# Patient Record
Sex: FEMALE | Race: WHITE | Employment: FULL TIME | URBAN - METROPOLITAN AREA
[De-identification: names, ages, dates, MRNs, and addresses within clinical notes are randomized per-mention and may not be internally consistent; named-entity substitution may affect disease eponyms.]

---

## 2018-08-23 ENCOUNTER — HOSPITAL ENCOUNTER (EMERGENCY)
Facility: CLINIC | Age: 48
Discharge: HOME | End: 2018-08-23
Attending: PSYCHIATRY & NEUROLOGY | Admitting: PSYCHIATRY & NEUROLOGY
Payer: COMMERCIAL

## 2018-08-23 ENCOUNTER — HOSPITAL ENCOUNTER (EMERGENCY)
Facility: CLINIC | Age: 48
Discharge: HOME | End: 2018-08-23
Attending: EMERGENCY MEDICINE | Admitting: EMERGENCY MEDICINE
Payer: COMMERCIAL

## 2018-08-23 VITALS
SYSTOLIC BLOOD PRESSURE: 112 MMHG | WEIGHT: 175 LBS | HEIGHT: 65 IN | RESPIRATION RATE: 18 BRPM | TEMPERATURE: 98.1 F | DIASTOLIC BLOOD PRESSURE: 66 MMHG | OXYGEN SATURATION: 96 % | HEART RATE: 94 BPM | BODY MASS INDEX: 29.16 KG/M2

## 2018-08-23 VITALS
DIASTOLIC BLOOD PRESSURE: 76 MMHG | WEIGHT: 175 LBS | OXYGEN SATURATION: 96 % | RESPIRATION RATE: 16 BRPM | SYSTOLIC BLOOD PRESSURE: 108 MMHG | TEMPERATURE: 97.4 F | BODY MASS INDEX: 29.12 KG/M2 | HEART RATE: 116 BPM

## 2018-08-23 DIAGNOSIS — F41.9 ANXIETY DISORDER, UNSPECIFIED TYPE: ICD-10-CM

## 2018-08-23 DIAGNOSIS — F41.9 ANXIETY: ICD-10-CM

## 2018-08-23 DIAGNOSIS — F41.9 SEVERE ANXIETY: ICD-10-CM

## 2018-08-23 DIAGNOSIS — R45.89 DEPRESSED MOOD: ICD-10-CM

## 2018-08-23 PROCEDURE — 25000128 H RX IP 250 OP 636: Performed by: EMERGENCY MEDICINE

## 2018-08-23 PROCEDURE — 99285 EMERGENCY DEPT VISIT HI MDM: CPT | Mod: 25 | Performed by: PSYCHIATRY & NEUROLOGY

## 2018-08-23 PROCEDURE — 90791 PSYCH DIAGNOSTIC EVALUATION: CPT

## 2018-08-23 PROCEDURE — 99284 EMERGENCY DEPT VISIT MOD MDM: CPT | Performed by: EMERGENCY MEDICINE

## 2018-08-23 PROCEDURE — 99284 EMERGENCY DEPT VISIT MOD MDM: CPT | Mod: Z6 | Performed by: PSYCHIATRY & NEUROLOGY

## 2018-08-23 PROCEDURE — 25000132 ZZH RX MED GY IP 250 OP 250 PS 637: Performed by: EMERGENCY MEDICINE

## 2018-08-23 PROCEDURE — 96372 THER/PROPH/DIAG INJ SC/IM: CPT | Performed by: EMERGENCY MEDICINE

## 2018-08-23 PROCEDURE — 99284 EMERGENCY DEPT VISIT MOD MDM: CPT | Mod: Z6 | Performed by: EMERGENCY MEDICINE

## 2018-08-23 PROCEDURE — 25000132 ZZH RX MED GY IP 250 OP 250 PS 637: Performed by: PSYCHIATRY & NEUROLOGY

## 2018-08-23 RX ORDER — HYDROXYZINE HYDROCHLORIDE 25 MG/1
50 TABLET, FILM COATED ORAL ONCE
Status: COMPLETED | OUTPATIENT
Start: 2018-08-23 | End: 2018-08-23

## 2018-08-23 RX ORDER — ONDANSETRON 4 MG/1
4 TABLET, ORALLY DISINTEGRATING ORAL ONCE
Status: DISCONTINUED | OUTPATIENT
Start: 2018-08-23 | End: 2018-08-23

## 2018-08-23 RX ORDER — QUETIAPINE FUMARATE 50 MG/1
50 TABLET, EXTENDED RELEASE ORAL AT BEDTIME
Qty: 30 EACH | Refills: 1 | Status: SHIPPED | OUTPATIENT
Start: 2018-08-23

## 2018-08-23 RX ORDER — CLONAZEPAM 1 MG/1
1 TABLET ORAL 2 TIMES DAILY PRN
COMMUNITY

## 2018-08-23 RX ORDER — HYDROXYZINE HYDROCHLORIDE 25 MG/ML
50 INJECTION, SOLUTION INTRAMUSCULAR ONCE
Status: DISCONTINUED | OUTPATIENT
Start: 2018-08-23 | End: 2018-08-23 | Stop reason: RX

## 2018-08-23 RX ORDER — VENLAFAXINE HYDROCHLORIDE 150 MG/1
186 CAPSULE, EXTENDED RELEASE ORAL DAILY
COMMUNITY
Start: 2016-09-21

## 2018-08-23 RX ORDER — BUSPIRONE HYDROCHLORIDE 7.5 MG/1
7.5 TABLET ORAL 3 TIMES DAILY
COMMUNITY

## 2018-08-23 RX ORDER — QUETIAPINE FUMARATE 25 MG/1
25-50 TABLET, FILM COATED ORAL 2 TIMES DAILY PRN
Qty: 60 TABLET | Refills: 0 | Status: SHIPPED | OUTPATIENT
Start: 2018-08-23 | End: 2018-08-23

## 2018-08-23 RX ORDER — VENLAFAXINE HYDROCHLORIDE 75 MG/1
75 CAPSULE, EXTENDED RELEASE ORAL
COMMUNITY
Start: 2016-09-21 | End: 2018-08-23 | Stop reason: DRUGHIGH

## 2018-08-23 RX ORDER — HYDROXYZINE HYDROCHLORIDE 50 MG/ML
50 INJECTION, SOLUTION INTRAMUSCULAR ONCE
Status: COMPLETED | OUTPATIENT
Start: 2018-08-23 | End: 2018-08-23

## 2018-08-23 RX ORDER — ONDANSETRON 4 MG/1
4 TABLET, ORALLY DISINTEGRATING ORAL EVERY 8 HOURS PRN
Qty: 10 TABLET | Refills: 1 | Status: SHIPPED | OUTPATIENT
Start: 2018-08-23

## 2018-08-23 RX ORDER — TRIAMCINOLONE ACETONIDE 1 MG/G
CREAM TOPICAL
COMMUNITY
Start: 2017-04-05

## 2018-08-23 RX ORDER — HYDROXYZINE HYDROCHLORIDE 25 MG/1
25-50 TABLET, FILM COATED ORAL EVERY 6 HOURS PRN
Qty: 90 TABLET | Refills: 1 | Status: SHIPPED | OUTPATIENT
Start: 2018-08-23

## 2018-08-23 RX ORDER — QUETIAPINE FUMARATE 50 MG/1
50 TABLET, EXTENDED RELEASE ORAL ONCE
Status: COMPLETED | OUTPATIENT
Start: 2018-08-23 | End: 2018-08-23

## 2018-08-23 RX ORDER — QUETIAPINE FUMARATE 25 MG/1
50 TABLET, FILM COATED ORAL 2 TIMES DAILY
Status: DISCONTINUED | OUTPATIENT
Start: 2018-08-23 | End: 2018-08-23 | Stop reason: HOSPADM

## 2018-08-23 RX ADMIN — QUETIAPINE FUMARATE 50 MG: 50 TABLET, EXTENDED RELEASE ORAL at 22:55

## 2018-08-23 RX ADMIN — HYDROXYZINE HYDROCHLORIDE 50 MG: 50 INJECTION, SOLUTION INTRAMUSCULAR at 07:52

## 2018-08-23 RX ADMIN — HYDROXYZINE HYDROCHLORIDE 50 MG: 25 TABLET ORAL at 22:54

## 2018-08-23 RX ADMIN — QUETIAPINE 50 MG: 25 TABLET ORAL at 07:53

## 2018-08-23 ASSESSMENT — ENCOUNTER SYMPTOMS
BACK PAIN: 0
CONSTITUTIONAL NEGATIVE: 1
ABDOMINAL PAIN: 0
NERVOUS/ANXIOUS: 1
CHEST TIGHTNESS: 0
DIZZINESS: 0
SLEEP DISTURBANCE: 1
HALLUCINATIONS: 0
DYSPHORIC MOOD: 1
SHORTNESS OF BREATH: 0
ABDOMINAL PAIN: 0
SLEEP DISTURBANCE: 1
HEMATOLOGIC/LYMPHATIC NEGATIVE: 1
VOMITING: 0
NERVOUS/ANXIOUS: 1
DYSPHORIC MOOD: 1
HALLUCINATIONS: 0
DECREASED CONCENTRATION: 1
FEVER: 0
CHEST TIGHTNESS: 0
NAUSEA: 1
NEUROLOGICAL NEGATIVE: 1
ALLERGIC/IMMUNOLOGIC NEGATIVE: 1
SHORTNESS OF BREATH: 0

## 2018-08-23 NOTE — ED AVS SNAPSHOT
[facility] Emergency Department    [address]    [facility] [address]    Phone:  [phone]    Fax:  [phone]                                       Li Purcell   MRN: [id number]    Department:  [facility] Emergency Department   Date of Visit:  8/23/2018           Patient Information     Date Of Birth          1970        Your diagnoses for this visit were:     Anxiety        You were seen by Melvin Will MD.      Follow-up Information     Follow up with Your mental health care provider In 1 day.    Why:  Tomorrow as scheduled        Discharge Instructions         Understanding Anxiety Disorders  Almost everyone gets nervous now and then. It s normal to have knots in your stomach before a test, or for your heart to race on a first date. But an anxiety disorder is much more than a case of nerves. In fact, its symptoms may be overwhelming. But treatment can relieve many of these symptoms. Talking to your healthcare provider is the first step.    What are anxiety disorders?  An anxiety disorder causes intense feelings of panic and fear. These feelings may arise for no apparent reason. And they tend to recur again and again. They may prevent you from coping with life and cause you great distress. As a result, you may avoid anything that triggers your fear. In extreme cases, you may never leave the house. Anxiety disorders may cause other symptoms, such as:    Obsessive thoughts you can t control    Constant nightmares or painful thoughts of the past    Nausea, sweating, and muscle tension    Trouble sleeping or concentrating  What causes anxiety disorders?  Anxiety disorders tend to run in families. For some people, childhood abuse or neglect may play a role. For others, stressful life events or trauma may trigger anxiety disorders. Anxiety can trigger low self-esteem and poor coping skills.  Common anxiety disorders    Panic disorder. This causes an intense fear of being in  danger.    Phobias. These are extreme fears of certain objects, places, or events.    Obsessive-compulsive disorder. This causes you to have unwanted thoughts and urges. You also may perform certain actions over and over.    Posttraumatic stress disorder. This occurs in people who have survived a terrible ordeal. It can cause nightmares and flashbacks about the event.    Generalized anxiety disorder. This causes constant worry that can greatly disrupt your life.   Getting better  You may believe that nothing can help you. Or, you might fear what others may think. But most anxiety symptoms can be eased. Having an anxiety disorder is nothing to be ashamed of. Most people do best with treatment that combines medicine and therapy. These aren t cures. But they can help you live a healthier life.  Date Last Reviewed: 2/1/2017 2000-2017 [employer]. [address] [place] [address]. All rights reserved. This information is not intended as a substitute for professional medical care. Always follow your healthcare professional's instructions.          Treating Anxiety Disorders with Medicine  An anxiety disorder can make you feel nervous or apprehensive, even without a clear reason. In people age 65 and older, generalized anxiety disorder is one of the most commonly diagnosed anxiety disorders. Many times it occurs with depression. Certain anxiety disorders can cause intense feelings of fear or panic. You may even have physical symptoms such as a racing heartbeat, sweating, or dizziness. If you have these feelings, you don t have to suffer anymore. Treatment to help you overcome your fears will likely include therapy (also called counseling). Medicine may also be prescribed to help control your symptoms.    Medicines  Certain medicines may be prescribed to help control your symptoms. So you may feel less anxious. You may also feel able to move forward with therapy. At first, medicines and dosages may  need to be adjusted to find what works best for you. Try to be patient. Tell your healthcare provider how a medicine makes you feel. This way, you can work together to find the treatment that s best for you. Keep in mind that medicines can have side effects. Talk with your provider about any side effects that are bothering you. Changing the dose or type of medicine may help. Don t stop taking medicine on your own. That can cause symptoms to come back.    Anti-anxiety medicine. This medicine eases symptoms and helps you relax. Your healthcare provider will explain when and how to use it. It may be prescribed for use before situations that make you anxious. You may also be told to take medicine on a regular schedule. Anti-anxiety medicine may make you feel a little sleepy or  out of it.  Don t drive a car or operate machinery while on this medicine, until you know how it affects you.  Caution  Never use alcohol or other drugs with anti-anxiety medicines. This could result in loss of muscular control, sedation, coma, or death. Also, use only the amount of medicine prescribed for you. If you think you may have taken too much, get emergency care right away.     Antidepressant medicine. This kind of medicine is often used to treat anxiety, even if you aren t depressed. An antidepressant helps balance out brain chemicals. This helps keep anxiety under control. This medicine is taken on a schedule. It takes a few weeks to start working. If you don t notice a change at first, you may just need more time. But if you don t notice results after the first few weeks, tell your provider.  Keep taking medicines as prescribed  Never change your dosage, share or use another person's medicine, or stop taking your medicines without talking to your healthcare provider first. Keep the following in mind:    Some medicines must be taken on a schedule. Make this part of your daily routine. For instance, always take your pill before brushing  your teeth. A pillbox can help you remember if you ve taken your medicine each day.    Medicines are often taken for 6 to 12 months. Your healthcare provider will then evaluate whether you need to stay on them. Many people who have also had therapy may no longer need medicine to manage anxiety.    You may need to stop taking medicine slowly to give your body time to adjust. When it s time to stop, your healthcare provider will tell you more. Remember: Never stop taking your medicine without talking to your provider first.    If symptoms return, you may need to start taking medicines again. This isn t your fault. It s just the nature of your anxiety disorder.  Special concerns    Side effects. Medicines may cause side effects. Ask your healthcare provider or pharmacist what you can expect. They may have ideas for avoiding some side effects.    Sexual problems. Some antidepressants can affect your desire for sex or your ability to have an orgasm. A change in dosage or medicine often solves the problem. If you have a sexual side effect that concerns you, tell your healthcare provider.    Addiction. If you ve never had a problem with drugs or alcohol, you may not have a problem with medicines used to treat anxiety disorders. But always discuss the medicines with your healthcare provider before taking them. If you have a history of addiction, you may not be able to use certain medicines used to treat anxiety disorders.    Medicine interactions. Always check with your pharmacist before using any over-the-counter medicines, including herbal supplements.   Date Last Reviewed: 5/1/2017 2000-2017 [employer]. [address] [place] [address], [place] [address]. All rights reserved. This information is not intended as a substitute for professional medical care. Always follow your healthcare professional's instructions.          Treating Anxiety Disorders with Therapy    If you have an anxiety disorder, you don t have to  suffer anymore. Treatment is available. Therapy (also called counseling) is often a helpful treatment for anxiety disorders. With therapy, a specially trained professional (therapist) helps you face and learn to manage your anxiety. Therapy can be short-term or long-term depending on your needs. In some cases, medicine may also be prescribed with therapy. It may take time before you notice how much therapy is helping, but stick with it. With therapy, you can feel better.  Cognitive behavioral therapy (CBT)  Cognitive behavioral therapy (CBT) teaches you to manage anxiety. It does this by helping you understand how you think and act when you re anxious. Research has shown CBT to be a very effective treatment for anxiety disorders. How CBT is run is almost like a class. It involves homework and activities to build skills that teach you to cope with anxiety step by step. It can be done in a group or one-on-one, and often takes place for a set number of sessions. CBT has two main parts:    Cognitive therapy helps you identify the negative, irrational thoughts that occur with your anxiety. You ll learn to replace these with more positive, realistic thoughts.    Behavioral therapy helps you change how you react to anxiety. You ll learn coping skills and methods for relaxing to help you better deal with anxiety.  Other forms of therapy  Other therapy methods may work better for you than CBT. Or, you may move from CBT to another form of therapy as your treatment needs change. This may mean meeting with a therapist by yourself or in a group. Therapy can also help you work through problems in your life, such as drug or alcohol dependence, that may be making your anxiety worse.  Getting better takes time  Therapy will help you feel better and teach you skills to help manage anxiety long term. But change doesn t happen right away. It takes a commitment from you. And treatment only works if you learn to face the causes of your  anxiety. So, you might feel worse before you feel better. This can sometimes make it hard to stick with it. But remember: Therapy is a very effective treatment. The results will be well worth it.  Helping yourself  If anxiety is wearing you down, here are some things you can do to cope:    Check with your doctor and rule out any physical problems that may be causing the anxiety symptoms.    If an anxiety disorder is diagnosed, seek mental healthcare. This is an illness and it can respond to treatment. Most types of anxiety disorders will respond to talk therapy and medicine.    Educate yourself about anxiety disorders. Keep track of helpful online resources and books you can use during stressful periods.    Try stress management techniques such as meditation.    Consider online or in-person support groups.    Don t fight your feelings. Anxiety feeds itself. The more you worry about it, the worse it gets. Instead, try to identify what might have triggered your anxiety. Then try to put this threat in perspective.    Keep in mind that you can t control everything about a situation. Change what you can and let the rest take its course.    Exercise -- it s a great way to relieve tension and help your body feel relaxed.    Examine your life for stress, and try to find ways to reduce it.    Avoid caffeine and nicotine, which can make anxiety symptoms worse.    Fight the temptation to turn to alcohol or unprescribed drugs for relief. They only make things worse in the long run.   Date Last Reviewed: 1/1/2017 2000-2017 [employer] [address] [place] [address], [place] [address]. All rights reserved. This information is not intended as a substitute for professional medical care. Always follow your healthcare professional's instructions.          24 Hour Appointment Hotline       To make an appointment at any [facility], call [phone] ([phone]). If you don't have a family doctor or clinic, we will  help you find one. [facility] are conveniently located to serve the needs of you and your family.             Review of your medicines      START taking        Dose / Directions Last dose taken    QUEtiapine 25 MG tablet   Commonly known as:  SEROQUEL   Dose:  25-50 mg   Quantity:  60 tablet        Take 1-2 tablets (25-50 mg) by mouth 2 times daily as needed   Refills:  0          Our records show that you are taking the medicines listed below. If these are incorrect, please call your family doctor or clinic.        Dose / Directions Last dose taken    busPIRone 7.5 MG tablet   Commonly known as:  BUSPAR   Dose:  7.5 mg        Take 7.5 mg by mouth 3 times daily   Refills:  0        clonazePAM 1 MG tablet   Commonly known as:  klonoPIN   Dose:  1 mg        Take 1 mg by mouth 2 times daily as needed for anxiety   Refills:  0        traZODone 25 mg Tabs half-tab   Commonly known as:  DESYREL   Dose:  150 mg        Take 150 mg by mouth At Bedtime   Refills:  0        triamcinolone 0.1 % cream   Commonly known as:  KENALOG        Refills:  0        * venlafaxine 150 MG 24 hr capsule   Commonly known as:  EFFEXOR-XR   Dose:  150 mg        Take 150 mg by mouth   Refills:  0        * venlafaxine 75 MG 24 hr capsule   Commonly known as:  EFFEXOR-XR   Dose:  75 mg        Take 75 mg by mouth   Refills:  0        * Notice:  This list has 2 medication(s) that are the same as other medications prescribed for you. Read the directions carefully, and ask your doctor or other care provider to review them with you.            Prescriptions were sent or printed at these locations (1 Prescription)                   [facility] [place], [place] - [address]   [address] [facility] [address]    Telephone:  [phone]   Fax:  [phone]   Hours:                  E-Prescribed (1 of 1)         QUEtiapine (SEROQUEL) 25 MG tablet                Orders Needing Specimen Collection      "None      Pending Results     No orders found from 2018 to 2018.            Pending Culture Results     No orders found from 2018 to 2018.            Pending Results Instructions     If you had any lab results that were not finalized at the time of your Discharge, you can call the ED Lab Result RN at [phone]. You will be contacted by this team for any positive Lab results or changes in treatment. The nurses are available 7 days a week from 10A to 6:30P.  You can leave a message 24 hours per day and they will return your call.        Test Results From Your Hospital Stay               Thank you for choosing [facility]       Thank you for choosing [facility] for your care. Our goal is always to provide you with excellent care. Hearing back from our patients is one way we can continue to improve our services. Please take a few minutes to complete the written survey that you may receive in the mail after you visit with us. Thank you!        [employer] Information     [employer] lets you send messages to your doctor, view your test results, renew your prescriptions, schedule appointments and more. To sign up, go to [email or web address] . Click on \"Log in\" on the left side of the screen, which will take you to the Welcome page. Then click on \"Sign up Now\" on the right side of the page.     You will be asked to enter the access code listed below, as well as some personal information. Please follow the directions to create your username and password.     Your access code is: [email or web address]  Expires: 2018  8:34 AM     Your access code will  in 90 days. If you need help or a new code, please call your [facility] clinic or [phone].        Care EveryWhere ID     This is your Care EveryWhere ID. This could be used by other organizations to access your [facility] medical records  [id number]        Equal Access to Services     PATEL LEIVA : katie Moura qaybta " noelle allen ah. So [facility] [phone].    ATENCIÓN: Si habla español, tiene a duff disposición servicios gratuitos de asistencia lingüística. Llame al [phone].    We comply with applicable federal civil rights laws and [place] laws. We do not discriminate on the basis of race, color, national origin, age, disability, sex, sexual orientation, or gender identity.            After Visit Summary       This is your record. Keep this with you and show to your community pharmacist(s) and doctor(s) at your next visit.

## 2018-08-23 NOTE — ED AVS SNAPSHOT
[facility] Emergency Department    [address] [facility] [address]    Phone:  [phone]    Fax:  [phone]                                       Li Purcell   MRN: [id number]    Department:  [facility] Emergency Department   Date of Visit:  8/23/2018           After Visit Summary Signature Page     I have received my discharge instructions, and my questions have been answered. I have discussed any challenges I see with this plan with the nurse or doctor.    ..........................................................................................................................................  Patient/Patient Representative Signature      ..........................................................................................................................................  Patient Representative Print Name and Relationship to Patient    ..................................................               ................................................  Date                                            Time    ..........................................................................................................................................  Reviewed by Signature/Title    ...................................................              ..............................................  Date                                                            Time

## 2018-08-23 NOTE — ED PROVIDER NOTES
History     Chief Complaint   Patient presents with     Anxiety     started yesterday morning, continues today. racing thoughts, nausea, feel overwhelmed, unable to settle self.      MODESTO Purcell is a 48 year old female with history of anxiety disorder who presents with complaint of worsening anxiety in the past 24 hours.  She is feeling anxious, nervous, restlessness, on edge, overwhelmed, and has muscle tension and nausea.  No vomiting.  Symptoms are refractory to Effexor/Venlafaxine 187.5 mg once daily, BuSpar 7.5 twice daily which was initiated 2 days ago, and Clonazepam 0.5 mg once or twice daily.  Last dose of Clonazepam was several hours ago.  She reports worsening anxiety since medication change in late June, approximately 2 months ago.  She reports that at that time was started on Abilify and had Effexor dose increased.  She has been off Abilify for approximately 1 month.  BuSpar was just started 2 days ago by her mental health care provider.  She was started on 7.5 mg twice daily with plan to increase this to 15 mg twice daily in 1 week. She reports that she was previously on BuSpar and discontinued this due to side effects.  She reports that she has an appointment with her mental health care provider tomorrow.  She is previously used Seroquel with good success, but stopped this due to sedation making it difficult for her to work.  She has not been working the past month due to overwhelming anxiety.  She denies suicidal ideation.  No other acute complaints or concerns.    Previous Records in Care Everywhere were Reviewed:  Allergies    Active Allergy Reactions Severity Noted Date Comments   Erythromycin Unknown   04/18/2016     Indomethacin Rash   04/18/2016 Rash with associated blisters. No other mucosal involvement.     Prochlorperazine Other, see comments   04/18/2016 Muscle tightening     Current Medications    Prescription Sig. Disp. Refills Start Date End Date Status   QUEtiapine (SEROQUEL)  50 MG tablet    Indications: Depression, unspecified depression type Take 1 Tab by mouth daily at bedtime. 90 Tab   3 2016   Active   venlafaxine (EFFEXORXR) 150 MG 24 hour release capsule    Indications: Depression, unspecified depression type Take 1 Cap by mouth daily. Take 1 capsule by mouth. together with 75mg for total of 225mg for mood 90 Cap   3 2016   Active   venlafaxine (EFFEXORXR) 75 MG 24 hour release capsule    Indications: Depression, unspecified depression type Take 1 Cap by mouth daily. 90 Cap   3 2016   Active   triamcinolone acetonide (KENALOG) 0.1 % cream   Apply topically two times a day. Apply thin layer to affected area twice daily as needed 80 g   0 2017   Active   HYDROcodone-acetaminophen (NORCO) 5-325 MG tablet    Indications: Chronic low back pain, unspecified back pain laterality, with sciatica presence unspecified TAKE ONE TO TWO TABLETS BY MOUTH EVERY 6 HOURS AS NEEDED FOR LOW BACK PAIN 20 Tab   0 2017   Active   Active Problems  Reconcile with Patient's Chart    Problem Noted Date   Chronic constipation 2016   ANNALISA (obstructive sleep apnea) 2016   Low back pain ([facility]) 2013   Adjustment disorder with mixed anxiety and depressed mood ([facility]) 2007   Most Recent Encounters    Date Type Specialty Care Team Description   10/18/2017 Telephone Family Medicine Cecilia Lopez MD   REFERRAL REQUEST (Diagnostic mammogram)   2017 Telephone Family Medicine Cecilia Lopez MD   FYI   04/10/2017 Telephone Pediatrics Cecilia Lopez MD   RESULTS, TEST (positive pertussis exposure )   Immunizations  Reconcile with Patient's Chart    Name Dates Previously Given Next Due   Td 2003     Tdap 2011, 2002, 1988     Surgical History    Surgery Date Laterality Comments   TONSILLECTOMY 1972       LAP CHOLECYSTECTOMY         SECTION 1997   x1 got to 10cm and 3 hrs pushing   VAGINAL DELIVERY 1999   x1   Medical  History    Medical History Date Comments   Adjustment disorder with mixed anxiety and depressed mood ([facility])      Nondependent alcohol abuse, in remission   no tx, sober since    Other, mixed, or unspecified nondependent drug abuse, unspecified   cocaine, quit in    Chronic constipation 2016     ANNALISA (obstructive sleep apnea) 2016     Family History    Medical History Relation Name Comments   Cancer Birth Father   dec age 59, lung ca, had dm, htn   Depression Birth Father       Hyperlipidemia Birth Father       Hypertension Birth Father       Anxiety Birth Mother       Osteoporosis Birth Mother       Thyroid Disorder Birth Mother   alive, thyroid   Alcohol/Drug Abuse Brother   alive, 9 yrs older, alcoholism, aortic aneurysm, depression   Depression Brother       Good Health Brother   alive, 10 yrs older   Cancer, Breast Maternal Grandmother   dec age 70s, breast ca   Cancer, Other Maternal Grandmother       Cancer, Other Paternal Grandfather       Dementia Paternal Grandmother       Depression Paternal Grandmother       Good Health Sister   alive, 4 yrs older     Relation Name Status Comments   Birth Father    (Age 59)     Birth Mother   Alive     Brother   Alive     Brother   Alive     Brother         Maternal Grandfather        Maternal Grandmother        Paternal Grandfather        Paternal Grandmother        Sister   Alive     Sister         Social History    Tobacco Use Types Packs/Day Years Used Date   Never Smoker           Smokeless Tobacco: Never Used           Alcohol Use Drinks/Week oz/Week Comments   No          Problem List:    There are no active problems to display for this patient.       Past Medical History:    Past Medical History:   Diagnosis Date     Anxiety        Past Surgical History:    Past Surgical History:   Procedure Laterality Date     CHOLECYSTECTOMY       GYN SURGERY         Family History:    No family history on  "file.    Social History:  Marital Status:   [2]  Social History   Substance Use Topics     Smoking status: Never Smoker     Smokeless tobacco: Never Used     Alcohol use No        Medications:      busPIRone (BUSPAR) 7.5 MG tablet   clonazePAM (KLONOPIN) 1 MG tablet   ondansetron (ZOFRAN ODT) 4 MG ODT tab   QUEtiapine (SEROQUEL) 25 MG tablet   traZODone (DESYREL) 25 mg TABS half-tab   triamcinolone (KENALOG) 0.1 % cream   venlafaxine (EFFEXOR-XR) 150 MG 24 hr capsule   venlafaxine (EFFEXOR-XR) 75 MG 24 hr capsule       Review of Systems   Constitutional: Negative.    HENT: Negative.    Respiratory: Negative for chest tightness and shortness of breath.    Cardiovascular: Negative for chest pain and leg swelling.   Gastrointestinal: Positive for nausea. Negative for abdominal pain and vomiting.   Genitourinary: Negative.    Allergic/Immunologic: Negative.    Neurological: Negative.    Hematological: Negative.    Psychiatric/Behavioral: Positive for decreased concentration, dysphoric mood and sleep disturbance. Negative for hallucinations and suicidal ideas. The patient is nervous/anxious.          Physical Exam   BP: 112/66  Pulse: 94  Temp: 98.1  F (36.7  C)  Resp: 18  Height: 165.1 cm (5' 5\")  Weight: 79.4 kg (175 lb)  SpO2: 96 %      Physical Exam   Constitutional: She is oriented to person, place, and time. She appears well-developed and well-nourished. She appears distressed.   HENT:   Head: Normocephalic and atraumatic.   Mouth/Throat: Oropharynx is clear and moist.   Eyes: Conjunctivae and EOM are normal. No scleral icterus.   Neck: Normal range of motion. Neck supple. No tracheal deviation present. No thyromegaly present.   Cardiovascular: Normal rate, regular rhythm and normal heart sounds.    Pulmonary/Chest: Effort normal and breath sounds normal. No respiratory distress.   Abdominal: Soft. She exhibits no distension. There is no tenderness.   Musculoskeletal: Normal range of motion. She exhibits no " edema.   Neurological: She is alert and oriented to person, place, and time. She has normal strength. Gait normal.   Skin: Skin is warm and dry. No rash noted. She is not diaphoretic. No erythema. No pallor.   Psychiatric: Her speech is normal. Her mood appears anxious ( Anxious affect). She is not actively hallucinating. Thought content is not paranoid and not delusional. She exhibits a depressed mood ( Depressed mood). She expresses no suicidal ideation.   Nursing note and vitals reviewed.      ED Course     ED Course     Procedures                 No results found for this or any previous visit (from the past 24 hour(s)).    Medications   hydrOXYzine (VISTARIL) injection 50 mg (50 mg Intramuscular Given 8/23/18 0752)       Assessments & Plan (with Medical Decision Making)   Patient with anxiety disorder with severe anxiety for refractory to Effexor, Clonazepam and BuSpar started 2 days ago.  She has previously had good success with Seroquel for anxiety, but discontinued this due to sedation which caused difficulty working during the day.  She has not been able to work in the past 1 month due to severe anxiety.  We will have her restart Seroquel 25-50 mg twice daily and follow-up as scheduled with her mental health care provider tomorrow.  She expressed disappointment that her mental health care provider has not been able to help her gain control of her anxiety with medication adjustment, and she has not made an appointment with a psychiatrist due to the long wait to see one (appointment for 2.5 months out).  She is not followed by a psychologist/therapist.  I encouraged her to establish care with these and discuss referral and use of these resources with her mental health care provider tomorrow, in schedule follow-up.  She is not suicidal, psychotic or an active danger to herself or others.  She was discharged with her  will sister, and instructions for supportive care.    I have reviewed the nursing  notes.    I have reviewed the findings, diagnosis, plan and need for follow up with the patient.    Discharge Medication List as of 8/23/2018  8:34 AM      START taking these medications    Details   QUEtiapine (SEROQUEL) 25 MG tablet Take 1-2 tablets (25-50 mg) by mouth 2 times daily as needed, Disp-60 tablet, R-0, E-Prescribe             Final diagnoses:   Severe anxiety   Anxiety disorder, unspecified type   Depressed mood       8/23/2018   [facility] EMERGENCY DEPARTMENT     Melvin Will MD  08/23/18 7753

## 2018-08-23 NOTE — DISCHARGE INSTRUCTIONS
Understanding Anxiety Disorders  Almost everyone gets nervous now and then. It s normal to have knots in your stomach before a test, or for your heart to race on a first date. But an anxiety disorder is much more than a case of nerves. In fact, its symptoms may be overwhelming. But treatment can relieve many of these symptoms. Talking to your healthcare provider is the first step.    What are anxiety disorders?  An anxiety disorder causes intense feelings of panic and fear. These feelings may arise for no apparent reason. And they tend to recur again and again. They may prevent you from coping with life and cause you great distress. As a result, you may avoid anything that triggers your fear. In extreme cases, you may never leave the house. Anxiety disorders may cause other symptoms, such as:    Obsessive thoughts you can t control    Constant nightmares or painful thoughts of the past    Nausea, sweating, and muscle tension    Trouble sleeping or concentrating  What causes anxiety disorders?  Anxiety disorders tend to run in families. For some people, childhood abuse or neglect may play a role. For others, stressful life events or trauma may trigger anxiety disorders. Anxiety can trigger low self-esteem and poor coping skills.  Common anxiety disorders    Panic disorder. This causes an intense fear of being in danger.    Phobias. These are extreme fears of certain objects, places, or events.    Obsessive-compulsive disorder. This causes you to have unwanted thoughts and urges. You also may perform certain actions over and over.    Posttraumatic stress disorder. This occurs in people who have survived a terrible ordeal. It can cause nightmares and flashbacks about the event.    Generalized anxiety disorder. This causes constant worry that can greatly disrupt your life.   Getting better  You may believe that nothing can help you. Or, you might fear what others may think. But most anxiety symptoms can be eased.  Having an anxiety disorder is nothing to be ashamed of. Most people do best with treatment that combines medicine and therapy. These aren t cures. But they can help you live a healthier life.  Date Last Reviewed: 2/1/2017 2000-2017 [employer]. [address] [place] [address], [place] [address]. All rights reserved. This information is not intended as a substitute for professional medical care. Always follow your healthcare professional's instructions.          Treating Anxiety Disorders with Medicine  An anxiety disorder can make you feel nervous or apprehensive, even without a clear reason. In people age 65 and older, generalized anxiety disorder is one of the most commonly diagnosed anxiety disorders. Many times it occurs with depression. Certain anxiety disorders can cause intense feelings of fear or panic. You may even have physical symptoms such as a racing heartbeat, sweating, or dizziness. If you have these feelings, you don t have to suffer anymore. Treatment to help you overcome your fears will likely include therapy (also called counseling). Medicine may also be prescribed to help control your symptoms.    Medicines  Certain medicines may be prescribed to help control your symptoms. So you may feel less anxious. You may also feel able to move forward with therapy. At first, medicines and dosages may need to be adjusted to find what works best for you. Try to be patient. Tell your healthcare provider how a medicine makes you feel. This way, you can work together to find the treatment that s best for you. Keep in mind that medicines can have side effects. Talk with your provider about any side effects that are bothering you. Changing the dose or type of medicine may help. Don t stop taking medicine on your own. That can cause symptoms to come back.    Anti-anxiety medicine. This medicine eases symptoms and helps you relax. Your healthcare provider will explain when and how to use it. It may be  prescribed for use before situations that make you anxious. You may also be told to take medicine on a regular schedule. Anti-anxiety medicine may make you feel a little sleepy or  out of it.  Don t drive a car or operate machinery while on this medicine, until you know how it affects you.  Caution  Never use alcohol or other drugs with anti-anxiety medicines. This could result in loss of muscular control, sedation, coma, or death. Also, use only the amount of medicine prescribed for you. If you think you may have taken too much, get emergency care right away.     Antidepressant medicine. This kind of medicine is often used to treat anxiety, even if you aren t depressed. An antidepressant helps balance out brain chemicals. This helps keep anxiety under control. This medicine is taken on a schedule. It takes a few weeks to start working. If you don t notice a change at first, you may just need more time. But if you don t notice results after the first few weeks, tell your provider.  Keep taking medicines as prescribed  Never change your dosage, share or use another person's medicine, or stop taking your medicines without talking to your healthcare provider first. Keep the following in mind:    Some medicines must be taken on a schedule. Make this part of your daily routine. For instance, always take your pill before brushing your teeth. A pillbox can help you remember if you ve taken your medicine each day.    Medicines are often taken for 6 to 12 months. Your healthcare provider will then evaluate whether you need to stay on them. Many people who have also had therapy may no longer need medicine to manage anxiety.    You may need to stop taking medicine slowly to give your body time to adjust. When it s time to stop, your healthcare provider will tell you more. Remember: Never stop taking your medicine without talking to your provider first.    If symptoms return, you may need to start taking medicines again. This  isn t your fault. It s just the nature of your anxiety disorder.  Special concerns    Side effects. Medicines may cause side effects. Ask your healthcare provider or pharmacist what you can expect. They may have ideas for avoiding some side effects.    Sexual problems. Some antidepressants can affect your desire for sex or your ability to have an orgasm. A change in dosage or medicine often solves the problem. If you have a sexual side effect that concerns you, tell your healthcare provider.    Addiction. If you ve never had a problem with drugs or alcohol, you may not have a problem with medicines used to treat anxiety disorders. But always discuss the medicines with your healthcare provider before taking them. If you have a history of addiction, you may not be able to use certain medicines used to treat anxiety disorders.    Medicine interactions. Always check with your pharmacist before using any over-the-counter medicines, including herbal supplements.   Date Last Reviewed: 5/1/2017 2000-2017 [employer]. [address] [place] [address]. All rights reserved. This information is not intended as a substitute for professional medical care. Always follow your healthcare professional's instructions.          Treating Anxiety Disorders with Therapy    If you have an anxiety disorder, you don t have to suffer anymore. Treatment is available. Therapy (also called counseling) is often a helpful treatment for anxiety disorders. With therapy, a specially trained professional (therapist) helps you face and learn to manage your anxiety. Therapy can be short-term or long-term depending on your needs. In some cases, medicine may also be prescribed with therapy. It may take time before you notice how much therapy is helping, but stick with it. With therapy, you can feel better.  Cognitive behavioral therapy (CBT)  Cognitive behavioral therapy (CBT) teaches you to manage anxiety. It does this by  helping you understand how you think and act when you re anxious. Research has shown CBT to be a very effective treatment for anxiety disorders. How CBT is run is almost like a class. It involves homework and activities to build skills that teach you to cope with anxiety step by step. It can be done in a group or one-on-one, and often takes place for a set number of sessions. CBT has two main parts:    Cognitive therapy helps you identify the negative, irrational thoughts that occur with your anxiety. You ll learn to replace these with more positive, realistic thoughts.    Behavioral therapy helps you change how you react to anxiety. You ll learn coping skills and methods for relaxing to help you better deal with anxiety.  Other forms of therapy  Other therapy methods may work better for you than CBT. Or, you may move from CBT to another form of therapy as your treatment needs change. This may mean meeting with a therapist by yourself or in a group. Therapy can also help you work through problems in your life, such as drug or alcohol dependence, that may be making your anxiety worse.  Getting better takes time  Therapy will help you feel better and teach you skills to help manage anxiety long term. But change doesn t happen right away. It takes a commitment from you. And treatment only works if you learn to face the causes of your anxiety. So, you might feel worse before you feel better. This can sometimes make it hard to stick with it. But remember: Therapy is a very effective treatment. The results will be well worth it.  Helping yourself  If anxiety is wearing you down, here are some things you can do to cope:    Check with your doctor and rule out any physical problems that may be causing the anxiety symptoms.    If an anxiety disorder is diagnosed, seek mental healthcare. This is an illness and it can respond to treatment. Most types of anxiety disorders will respond to talk therapy and medicine.    Educate  yourself about anxiety disorders. Keep track of helpful online resources and books you can use during stressful periods.    Try stress management techniques such as meditation.    Consider online or in-person support groups.    Don t fight your feelings. Anxiety feeds itself. The more you worry about it, the worse it gets. Instead, try to identify what might have triggered your anxiety. Then try to put this threat in perspective.    Keep in mind that you can t control everything about a situation. Change what you can and let the rest take its course.    Exercise -- it s a great way to relieve tension and help your body feel relaxed.    Examine your life for stress, and try to find ways to reduce it.    Avoid caffeine and nicotine, which can make anxiety symptoms worse.    Fight the temptation to turn to alcohol or unprescribed drugs for relief. They only make things worse in the long run.   Date Last Reviewed: 1/1/2017 2000-2017 [employer]. [address] [place] [address], [place] [address]. All rights reserved. This information is not intended as a substitute for professional medical care. Always follow your healthcare professional's instructions.

## 2018-08-23 NOTE — ED AVS SNAPSHOT
[facility], [place], Emergency Department    [address]    [facility] [address]    Phone:  [phone]    Fax:  [phone]                                       Li Purcell   MRN: [id number]    Department:  [facility], Emergency Department   Date of Visit:  8/23/2018           After Visit Summary Signature Page     I have received my discharge instructions, and my questions have been answered. I have discussed any challenges I see with this plan with the nurse or doctor.    ..........................................................................................................................................  Patient/Patient Representative Signature      ..........................................................................................................................................  Patient Representative Print Name and Relationship to Patient    ..................................................               ................................................  Date                                            Time    ..........................................................................................................................................  Reviewed by Signature/Title    ...................................................              ..............................................  Date                                                            Time          22EPIC Rev 08/18

## 2018-08-23 NOTE — ED AVS SNAPSHOT
[facility], Emergency Department    [address]    [place] [address]    Phone:  [phone]    Fax:  [phone]                                       Li Purcell   MRN: [id number]    Department:  [facility], Emergency Department   Date of Visit:  8/23/2018           Patient Information     Date Of Birth          1970        Your diagnoses for this visit were:     Anxiety        You were seen by Mitch Beasley MD.        Discharge Instructions       Start seroquel xr 50 mg at bedtime    Start hydroxyzine (vistaril) 25-50 mg every 4 hours as needed for anxiety    Stop buspar    Follow up with therapy on Thursday 8/30/18 at noon    Follow up with your medication provider until a new provider can be set up.  [facility] will call you try to schedule a new provider.    24 Hour Appointment Hotline       To make an appointment at any [facility] clinic, call [phone] ([phone]). If you don't have a family doctor or clinic, we will help you find one. [facility] clinics are conveniently located to serve the needs of you and your family.             Review of your medicines      START taking        Dose / Directions Last dose taken    hydrOXYzine 25 MG tablet   Commonly known as:  ATARAX   Dose:  25-50 mg   Quantity:  90 tablet        Take 1-2 tablets (25-50 mg) by mouth every 6 hours as needed for anxiety   Refills:  1        QUEtiapine 50 MG Tb24 24 hr tablet   Commonly known as:  SEROquel XR   Dose:  50 mg   Quantity:  30 each   Replaces:  QUEtiapine 25 MG tablet        Take 1 tablet (50 mg) by mouth At Bedtime   Refills:  1          Our records show that you are taking the medicines listed below. If these are incorrect, please call your family doctor or clinic.        Dose / Directions Last dose taken    busPIRone 7.5 MG tablet   Commonly known as:  BUSPAR   Dose:  7.5 mg        Take 7.5 mg by mouth 3 times daily   Refills:  0        clonazePAM 1 MG tablet   Commonly known as:  klonoPIN    Dose:  1 mg        Take 1 mg by mouth 2 times daily as needed for anxiety   Refills:  0        ondansetron 4 MG ODT tab   Commonly known as:  ZOFRAN ODT   Dose:  4 mg   Quantity:  10 tablet        Take 1 tablet (4 mg) by mouth every 8 hours as needed for nausea   Refills:  1        traZODone 25 mg Tabs half-tab   Commonly known as:  DESYREL   Dose:  150 mg        Take 150 mg by mouth At Bedtime   Refills:  0        triamcinolone 0.1 % cream   Commonly known as:  KENALOG        Refills:  0        venlafaxine 150 MG 24 hr capsule   Commonly known as:  EFFEXOR-XR   Dose:  186 mg        Take 186 mg by mouth daily   Refills:  0          STOP taking        Dose Reason for stopping Comments    QUEtiapine 25 MG tablet   Commonly known as:  SEROQUEL   Replaced by:  QUEtiapine 50 MG Tb24 24 hr tablet                      Prescriptions were sent or printed at these locations (2 Prescriptions)                   Other Prescriptions                Printed at Department/Unit printer (2 of 2)         hydrOXYzine (ATARAX) 25 MG tablet               QUEtiapine (SEROQUEL XR) 50 MG TB24 24 hr tablet                Orders Needing Specimen Collection     Ordered          08/23/18 2049  Drug abuse screen 6 urine (chem dep) ([facility]) - STAT, [place]: STAT, Needs to be Collected     Scheduled Task Status   08/23/18 2049 Collect Drug abuse screen 6 urine (chem dep) ([facility]) Open   Order Class:  PCU Collect                08/23/18 2049  UA reflex to Microscopic and Culture - STAT, [facility]: STAT, Needs to be Collected     Scheduled Task Status   08/23/18 2050 Collect UA reflex to Microscopic and Culture Open   Order Class:  PCU Collect                  Pending Results     No orders found from 8/21/2018 to 8/24/2018.            Pending Culture Results     No orders found from 8/21/2018 to 8/24/2018.            Pending Results Instructions     If you had any lab results that were not finalized at the time of your Discharge, you can call the ED Lab Result  "RN at [phone]. You will be contacted by this team for any positive Lab results or changes in treatment. The nurses are available 7 days a week from 10A to 6:30P.  You can leave a message 24 hours per day and they will return your call.        Thank you for choosing [facility]       Thank you for choosing [facility] for your care. Our goal is always to provide you with excellent care. Hearing back from our patients is one way we can continue to improve our services. Please take a few minutes to complete the written survey that you may receive in the mail after you visit with us. Thank you!        [employer] Information     [employer] lets you send messages to your doctor, view your test results, renew your prescriptions, schedule appointments and more. To sign up, go to [email or web address] . Click on \"Log in\" on the left side of the screen, which will take you to the Welcome page. Then click on \"Sign up Now\" on the right side of the page.     You will be asked to enter the access code listed below, as well as some personal information. Please follow the directions to create your username and password.     Your access code is: [email or web address]  Expires: 2018  8:34 AM     Your access code will  in 90 days. If you need help or a new code, please call your [facility] clinic or [phone].        Care EveryWhere ID     This is your Care EveryWhere ID. This could be used by other organizations to access your [facility] medical records  [id number]        Equal Access to Services     PATEL LEIVA : Hadii iron Lina[place], waaxda demetra, qaybta kaalmanoelle fowler . So [facility] [phone].    ATENCIÓN: Si habla español, tiene a duff disposición servicios gratuitos de asistencia lingüística. Llame al [phone].    We comply with applicable federal civil rights laws and [place] laws. We do not discriminate on the basis of race, color, national origin, age, disability, " sex, sexual orientation, or gender identity.            After Visit Summary       This is your record. Keep this with you and show to your community pharmacist(s) and doctor(s) at your next visit.

## 2018-08-23 NOTE — ED TRIAGE NOTES
Pt with hx of anxiety, experiencing exacerbation of panic since yesterday. Pt states meds changed in middle to end of June and that her sx have been worse since then. Pt reports feeling anxious, tense muscles, nausea, inability to sit still or relax. Pt sees MD with Alexand[facility] correa out of [place]. Pt not currently with therapist--states she did call to make appointment with [facility], but unable to get appointment scheduled.

## 2018-08-24 NOTE — ED PROVIDER NOTES
"  History     Chief Complaint   Patient presents with     Anxiety     Increasing anxiety, panic and depression, \"I am starting to feel hopeless and that something is not right with my meds.\"     The history is provided by the patient, medical records and the spouse.     Li Purcell is a 48 year old female who comes in due to her high anxiety.  She has been struggling with anxiety since June.  It seems that many stressors added up to cause this including her not liking her job, finding a new job but not starting yet, a reaction to abilify that made her more anxious and some financial issues.  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 She is an [employer] and has had to miss some work due to the anxiety. She is starting to get more panic and some hopelessness that she will not feel better.  She is not sleeping well. She has lost trust in her medication provider due to being taken off seroquel (which worked) and started on abilify (which made everything worse). She is not suicidal or homicidal. She does not have a therapist.  She has been on effexor for a long time and it was combined with seroquel which seemed to work well in the past. She just got started on buspar for 2 days at 7.5 mg bid.  She also got started on clonazepam 2 weeks ago (0.5 mg bid prn).      Please see the 's assessment in [facility] from today (8/23/18) for further details.    I have reviewed the Medications, Allergies, Past Medical and Surgical History, and Social History in the Epic system.    Review of Systems   Constitutional: Negative for fever.   Eyes: Negative for visual disturbance.   Respiratory: Negative for chest tightness and shortness of breath.    Cardiovascular: Negative for chest pain.   Gastrointestinal: Negative for abdominal pain.   Musculoskeletal: Negative for back pain.   Neurological: Negative for dizziness.   Psychiatric/Behavioral: Positive for dysphoric mood and sleep disturbance. " Negative for hallucinations, self-injury and suicidal ideas. The patient is nervous/anxious.    All other systems reviewed and are negative.      Physical Exam   BP: 108/76  Pulse: 116  Heart Rate: 116  Temp: 97.4  F (36.3  C)  Resp: 16  Weight: 79.4 kg (175 lb)  SpO2: 96 %      Physical Exam   Constitutional: She is oriented to person, place, and time. She appears well-developed and well-nourished.   HENT:   Head: Normocephalic.   Cardiovascular: Normal rate, regular rhythm and normal heart sounds.    Pulmonary/Chest: Effort normal and breath sounds normal. No respiratory distress.   Neurological: She is alert and oriented to person, place, and time.   Psychiatric: Her speech is normal and behavior is normal. Judgment and thought content normal. Her mood appears anxious. She is not actively hallucinating. Thought content is not paranoid and not delusional. Cognition and memory are normal. She expresses no homicidal and no suicidal ideation. She expresses no suicidal plans and no homicidal plans.   Li is a 49 y/o female who looks her age.  She is well groomed with good eye contact.     Nursing note and vitals reviewed.      ED Course     ED Course     Procedures               Labs Ordered and Resulted from Time of ED Arrival Up to the Time of Departure from the ED - No data to display         Assessments & Plan (with Medical Decision Making)   Li will be discharged home.  She is not an imminent risk to herself or others.  She will be started on hydroxyzine 25-50 mg q 4 hours prn and seroquel xr 50 mg at bedtime.  She will stop the buspar since she just started it and it will not be effective while on benzos at the same time. She will be set up with therapy on 8/30/18.  She will follow up with her current medication provider but [facility] will attempt to find a new provider at Li's request.      I have reviewed the nursing notes.    I have reviewed the findings, diagnosis, plan and need for follow up with the  patient.    New Prescriptions    HYDROXYZINE (ATARAX) 25 MG TABLET    Take 1-2 tablets (25-50 mg) by mouth every 6 hours as needed for anxiety    QUETIAPINE (SEROQUEL XR) 50 MG TB24 24 HR TABLET    Take 1 tablet (50 mg) by mouth At Bedtime       Final diagnoses:   Anxiety       8/23/2018   [facility], [place], EMERGENCY DEPARTMENT     Mitch Beasley MD  08/23/18 0744

## 2018-08-24 NOTE — ED NOTES
Patient reports SI without plan. No previous attempts. Stressors include upcoming job change. Is able to contract for safety. Had a Rx change in June and has been having trouble since.

## 2018-08-24 NOTE — DISCHARGE INSTRUCTIONS
Start seroquel xr 50 mg at bedtime    Start hydroxyzine (vistaril) 25-50 mg every 4 hours as needed for anxiety    Stop buspar    Follow up with therapy on Thursday 8/30/18 at noon    Follow up with your medication provider until a new provider can be set up.  P will call you try to schedule a new provider.

## 2022-04-30 ENCOUNTER — HEALTH MAINTENANCE LETTER (OUTPATIENT)
Age: 52
End: 2022-04-30

## 2022-11-19 ENCOUNTER — HEALTH MAINTENANCE LETTER (OUTPATIENT)
Age: 52
End: 2022-11-19

## 2023-06-01 ENCOUNTER — HEALTH MAINTENANCE LETTER (OUTPATIENT)
Age: 53
End: 2023-06-01

## 2024-06-16 ENCOUNTER — HEALTH MAINTENANCE LETTER (OUTPATIENT)
Age: 54
End: 2024-06-16